# Patient Record
Sex: FEMALE | Race: BLACK OR AFRICAN AMERICAN | NOT HISPANIC OR LATINO | Employment: FULL TIME | URBAN - METROPOLITAN AREA
[De-identification: names, ages, dates, MRNs, and addresses within clinical notes are randomized per-mention and may not be internally consistent; named-entity substitution may affect disease eponyms.]

---

## 2024-04-16 ENCOUNTER — INITIAL PRENATAL (OUTPATIENT)
Dept: OBSTETRICS AND GYNECOLOGY | Facility: CLINIC | Age: 34
End: 2024-04-16
Payer: COMMERCIAL

## 2024-04-16 ENCOUNTER — LAB VISIT (OUTPATIENT)
Dept: LAB | Facility: HOSPITAL | Age: 34
End: 2024-04-16
Attending: ADVANCED PRACTICE MIDWIFE
Payer: COMMERCIAL

## 2024-04-16 VITALS
BODY MASS INDEX: 48.97 KG/M2 | WEIGHT: 266.13 LBS | DIASTOLIC BLOOD PRESSURE: 70 MMHG | SYSTOLIC BLOOD PRESSURE: 120 MMHG | HEIGHT: 62 IN

## 2024-04-16 DIAGNOSIS — Z3A.01 LESS THAN 8 WEEKS GESTATION OF PREGNANCY: Primary | ICD-10-CM

## 2024-04-16 DIAGNOSIS — O99.011 ANEMIA DURING PREGNANCY IN FIRST TRIMESTER: ICD-10-CM

## 2024-04-16 DIAGNOSIS — O99.511 ASTHMA COMPLICATING PREGNANCY IN FIRST TRIMESTER: ICD-10-CM

## 2024-04-16 DIAGNOSIS — N91.2 AMENORRHEA: ICD-10-CM

## 2024-04-16 DIAGNOSIS — O34.219 HX OF CESAREAN SECTION COMPLICATING PREGNANCY: ICD-10-CM

## 2024-04-16 DIAGNOSIS — O99.211 OBESITY AFFECTING PREGNANCY IN FIRST TRIMESTER, UNSPECIFIED OBESITY TYPE: ICD-10-CM

## 2024-04-16 DIAGNOSIS — E88.819 INSULIN RESISTANCE: ICD-10-CM

## 2024-04-16 DIAGNOSIS — J45.909 ASTHMA COMPLICATING PREGNANCY IN FIRST TRIMESTER: ICD-10-CM

## 2024-04-16 DIAGNOSIS — Z3A.01 LESS THAN 8 WEEKS GESTATION OF PREGNANCY: ICD-10-CM

## 2024-04-16 PROBLEM — E88.810 METABOLIC SYNDROME X: Status: ACTIVE | Noted: 2024-02-14

## 2024-04-16 PROBLEM — J31.0 CHRONIC RHINITIS: Status: ACTIVE | Noted: 2018-09-21

## 2024-04-16 PROBLEM — L83 ACANTHOSIS NIGRICANS: Status: ACTIVE | Noted: 2017-06-21

## 2024-04-16 PROBLEM — E55.9 VITAMIN D DEFICIENCY: Status: ACTIVE | Noted: 2024-02-14

## 2024-04-16 LAB
ABO + RH BLD: NORMAL
BLD GP AB SCN CELLS X3 SERPL QL: NORMAL
SPECIMEN OUTDATE: NORMAL

## 2024-04-16 PROCEDURE — 83036 HEMOGLOBIN GLYCOSYLATED A1C: CPT | Performed by: ADVANCED PRACTICE MIDWIFE

## 2024-04-16 PROCEDURE — 80074 ACUTE HEPATITIS PANEL: CPT | Performed by: ADVANCED PRACTICE MIDWIFE

## 2024-04-16 PROCEDURE — 87491 CHLMYD TRACH DNA AMP PROBE: CPT | Performed by: ADVANCED PRACTICE MIDWIFE

## 2024-04-16 PROCEDURE — 86593 SYPHILIS TEST NON-TREP QUANT: CPT | Performed by: ADVANCED PRACTICE MIDWIFE

## 2024-04-16 PROCEDURE — 87086 URINE CULTURE/COLONY COUNT: CPT | Performed by: ADVANCED PRACTICE MIDWIFE

## 2024-04-16 PROCEDURE — 36415 COLL VENOUS BLD VENIPUNCTURE: CPT | Performed by: ADVANCED PRACTICE MIDWIFE

## 2024-04-16 PROCEDURE — 86762 RUBELLA ANTIBODY: CPT | Performed by: ADVANCED PRACTICE MIDWIFE

## 2024-04-16 PROCEDURE — 86901 BLOOD TYPING SEROLOGIC RH(D): CPT | Performed by: ADVANCED PRACTICE MIDWIFE

## 2024-04-16 PROCEDURE — 88175 CYTOPATH C/V AUTO FLUID REDO: CPT | Performed by: ADVANCED PRACTICE MIDWIFE

## 2024-04-16 PROCEDURE — 0500F INITIAL PRENATAL CARE VISIT: CPT | Mod: CPTII,S$GLB,, | Performed by: ADVANCED PRACTICE MIDWIFE

## 2024-04-16 PROCEDURE — 85025 COMPLETE CBC W/AUTO DIFF WBC: CPT | Performed by: ADVANCED PRACTICE MIDWIFE

## 2024-04-16 PROCEDURE — 83020 HEMOGLOBIN ELECTROPHORESIS: CPT | Performed by: ADVANCED PRACTICE MIDWIFE

## 2024-04-16 PROCEDURE — 87389 HIV-1 AG W/HIV-1&-2 AB AG IA: CPT | Performed by: ADVANCED PRACTICE MIDWIFE

## 2024-04-16 PROCEDURE — 99999 PR PBB SHADOW E&M-EST. PATIENT-LVL III: CPT | Mod: PBBFAC,,, | Performed by: ADVANCED PRACTICE MIDWIFE

## 2024-04-16 RX ORDER — METFORMIN HYDROCHLORIDE 750 MG/1
750 TABLET, EXTENDED RELEASE ORAL
COMMUNITY

## 2024-04-16 RX ORDER — LORATADINE 10 MG/1
1 TABLET ORAL NIGHTLY
COMMUNITY

## 2024-04-16 RX ORDER — HYDROGEN PEROXIDE 3 %
20 SOLUTION, NON-ORAL MISCELLANEOUS
COMMUNITY

## 2024-04-16 RX ORDER — PRENATAL WITH FERROUS FUM AND FOLIC ACID 3080; 920; 120; 400; 22; 1.84; 3; 20; 10; 1; 12; 200; 27; 25; 2 [IU]/1; [IU]/1; MG/1; [IU]/1; MG/1; MG/1; MG/1; MG/1; MG/1; MG/1; UG/1; MG/1; MG/1; MG/1; MG/1
1 TABLET ORAL DAILY
Qty: 30 TABLET | Refills: 11 | Status: SHIPPED | OUTPATIENT
Start: 2024-04-16 | End: 2025-04-16

## 2024-04-16 NOTE — LETTER
April 16, 2024      [facility] - OB GYN 2nd Fl  [address] [facility]  [place] [address]  Phone: [phone]  Fax: [phone]       Patient: Sallie Morelos   YOB: 1990  Date of Visit: 04/16/2024    To Whom It May Concern:    Kavon Morelos  was at [facility] on 04/16/2024. The patient may return to work/school on 04/17/2024 with no restrictions. If you have any questions or concerns, or if I can be of further assistance, please do not hesitate to contact me.    Sincerely,    Petre Johnson CNM/chuy

## 2024-04-16 NOTE — PROGRESS NOTES
CHIEF COMPLAINT:   Patient presents with      Possible Pregnancy        HISTORY OF PRESENT ILLNESS  Sallie Morelos 34 y.o.  presents for a NOB exam. She was confirmed with PCP.   The patient has no complaints today.  No nausea or vomiting. No bleeding or pain.  Pregnancy was not planned but is desired.  Partner is supportive of pregnancy. He is a  so not at visit today. Lives at home with partner and daughter.  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  Denies domestic abuse.  Denies chemical/pesticide/radiation exposure.  OB history:      LMP: Patient's last menstrual period was 2024.  EDC: Estimated Date of Delivery: 24  EGA: 7w3d       Health Maintenance   Topic Date Due    TETANUS VACCINE  2009    Hepatitis C Screening  Completed    Lipid Panel  Completed       Past Medical History:   Diagnosis Date    Anemia     Asthma     allergy induced    Insulin resistance        Past Surgical History:   Procedure Laterality Date     SECTION         Family History   Problem Relation Name Age of Onset    Breast cancer Neg Hx      Colon cancer Neg Hx      Ovarian cancer Neg Hx      Uterine cancer Neg Hx         Social History     Socioeconomic History    Marital status: Single   Tobacco Use    Smoking status: Never    Smokeless tobacco: Never   Substance and Sexual Activity    Alcohol use: Not Currently    Drug use: Not Currently     Types: Marijuana     Comment: d/c when pregnant    Sexual activity: Yes     Partners: Male     Birth control/protection: None     Social Determinants of Health     Financial Resource Strain: High Risk (2024)    Received from [facility] Subsidi[facility] and Affiliates    Overall Financial Resource Strain (CARDIA)     Difficulty of Paying Living Expenses: Hard   Food Insecurity: Food Insecurity Present (2024)    Received from [facility] Its Subsidiaries  and Affiliates    Hunger Vital Sign     Worried About Running Out of Food in the Last Year: Often true     Ran Out of Food in the Last Year: Often true   Transportation Needs: No Transportation Needs (1/10/2023)    Received from [facility] and Its Subsidiaries and Affiliates    PRAPARE - Transportation     Lack of Transportation (Medical): No     Lack of Transportation (Non-Medical): No   Physical Activity: Inactive (2/9/2024)    Received from [facility] Its Subsidiaries and Affiliates    Exercise Vital Sign     Days of Exercise per Week: 0 days     Minutes of Exercise per Session: 0 min   Stress: Stress Concern Present (2/9/2024)    Received from [facility] Its Subsidiaries and Affiliates    [place] [employer] - Occupational Stress Questionnaire     Feeling of Stress : Rather much   Social Connections: Moderately Isolated (2/9/2024)    Received from [facility]    Social Connection and Isolation Panel [NHANES]     Frequency of Communication with Friends and Family: More than three times a week     Frequency of Social Gatherings with Friends and Family: Never     Attends Jehovah's witness Services: More than 4 times per year     Active Member of Clubs or Organizations: No     Attends Club or Organization Meetings: Never     Marital Status: Never        Current Outpatient Medications   Medication Sig Dispense Refill    DOCOSAHEXAENOIC ACID ORAL Take 1 tablet by mouth every morning.      loratadine (CLARITIN) 10 mg tablet Take 1 tablet by mouth every evening.      metFORMIN (GLUCOPHAGE-XR) 750 MG ER 24hr tablet Take 750 mg by mouth.      esomeprazole (NEXIUM) 20 MG capsule Take 20 mg by mouth before breakfast.      PNV,calcium 72/iron/folic acid (PRENATAL VITAMIN) Tab Take 1 tablet by mouth once daily.  30 tablet 11     No current facility-administered medications for this visit.       Review of patient's allergies indicates:   Allergen Reactions    Fish containing products Anxiety, Blisters, Diarrhea, Hives, Itching, Nausea And Vomiting, Rash and Shortness Of Breath     Itching, N&V    Other         PHYSICAL EXAM   Vitals:    04/16/24 1007   BP: 120/70        PAIN SCALE: 0/10 None    PHYSICAL EXAM    ROS:  GENERAL: No fever, chills, fatigability or weight loss.  CV: Denies chest pain  PULM: Denies shortness of breath or wheezing.  ABDOMEN: Appetite fine. No weight loss. Denies diarrhea, abdominal pain, hematemesis or blood in stool.  URINARY: No flank pain, dysuria or hematuria.  REPRODUCTIVE: No abnormal vaginal bleeding.       PE:   APPEARANCE: Well nourished, well developed, in no acute distress  CHEST: Clear to auscultation bilaterally  CV: Regular rate and rhythm  ABDOMEN: Soft. No tenderness or masses. No hepatosplenomegaly. No hernias  PELVIC:   VULVA: No lesions. Normal female genitalia.  URETHRAL MEATUS: Normal size and location, no lesions, no prolapse.  URETHRA: No masses, tenderness, prolapse or scarring.  VAGINA: Moist and well rugated, no discharge, no significant cystocele or rectocele.  CERVIX: No lesions, normal diameter, no stenosis, no cervical motion tenderness.   UTERUS: 6wk size, regular shape, mobile, non-tender, normal position, good support.  ADNEXA: No masses, tenderness or CDS nodularity.  ANUS PERINEUM: No lesions, no relaxation, no external hemorrhoids.     UPT +    A/P:     -      Patient was counseled today on A.C.S. Pap guidelines and recommendations for yearly pelvic exams, mammograms and monthly self breast exams; to see her PCP for other health maintenance and pregnancy.   -      Patient's medications and medical history reviewed with patient and implications in pregnancy.   -      Pregnancy course discussed and 'AtoZ' book given. Patient was counseled on proper weight gain based  on the [place] [employer] [facility]'s recommendations based on her pre-pregnancy weight. Discussed foods to avoid in pregnancy (i.e. sushi, fish that are high in mercury, deli meat, and unpasteurized cheeses). Discussed prenatal vitamin options (i.e. stool softener, DHA). Discussed potential medical problems in pregnancy.  -     Discussed risk of Toxoplasmosis transmission from pets and reviewed risk reduction techniques.  -     Pt was counseled on exercise in pregnancy and weight gain recommendations.  -     Oriented to practice including CNM collaboration.   -     Follow-up u/s tomorrow scheduled in [facility]  -     f/u 4 weeks with LEONELA  -     labs today   -     pap smear and genprobe due today

## 2024-04-17 ENCOUNTER — LAB VISIT (OUTPATIENT)
Dept: LAB | Facility: HOSPITAL | Age: 34
End: 2024-04-17
Attending: ADVANCED PRACTICE MIDWIFE
Payer: COMMERCIAL

## 2024-04-17 ENCOUNTER — PROCEDURE VISIT (OUTPATIENT)
Dept: OBSTETRICS AND GYNECOLOGY | Facility: CLINIC | Age: 34
End: 2024-04-17
Payer: COMMERCIAL

## 2024-04-17 DIAGNOSIS — N91.2 AMENORRHEA: ICD-10-CM

## 2024-04-17 DIAGNOSIS — O36.80X0 PREGNANCY WITH INCONCLUSIVE FETAL VIABILITY, SINGLE OR UNSPECIFIED FETUS: Primary | ICD-10-CM

## 2024-04-17 DIAGNOSIS — Z3A.01 LESS THAN 8 WEEKS GESTATION OF PREGNANCY: ICD-10-CM

## 2024-04-17 DIAGNOSIS — O36.80X0 PREGNANCY WITH INCONCLUSIVE FETAL VIABILITY, SINGLE OR UNSPECIFIED FETUS: ICD-10-CM

## 2024-04-17 LAB
BASOPHILS # BLD AUTO: 0.02 K/UL (ref 0–0.2)
BASOPHILS NFR BLD: 0.4 % (ref 0–1.9)
DIFFERENTIAL METHOD BLD: ABNORMAL
EOSINOPHIL # BLD AUTO: 0.1 K/UL (ref 0–0.5)
EOSINOPHIL NFR BLD: 1.3 % (ref 0–8)
ERYTHROCYTE [DISTWIDTH] IN BLOOD BY AUTOMATED COUNT: 13.9 % (ref 11.5–14.5)
ESTIMATED AVG GLUCOSE: 103 MG/DL (ref 68–131)
HAV IGM SERPL QL IA: NORMAL
HBA1C MFR BLD: 5.2 % (ref 4–5.6)
HBV CORE IGM SERPL QL IA: NORMAL
HBV SURFACE AG SERPL QL IA: NORMAL
HCG INTACT+B SERPL-ACNC: NORMAL MIU/ML
HCT VFR BLD AUTO: 38.6 % (ref 37–48.5)
HCV AB SERPL QL IA: NORMAL
HGB A2 MFR BLD HPLC: 2.4 % (ref 2.2–3.2)
HGB BLD-MCNC: 13 G/DL (ref 12–16)
HGB FRACT BLD ELPH-IMP: NORMAL
HGB FRACT BLD ELPH-IMP: NORMAL
HIV 1+2 AB+HIV1 P24 AG SERPL QL IA: NORMAL
IMM GRANULOCYTES # BLD AUTO: 0.01 K/UL (ref 0–0.04)
IMM GRANULOCYTES NFR BLD AUTO: 0.2 % (ref 0–0.5)
LYMPHOCYTES # BLD AUTO: 1.7 K/UL (ref 1–4.8)
LYMPHOCYTES NFR BLD: 32.5 % (ref 18–48)
MCH RBC QN AUTO: 28.9 PG (ref 27–31)
MCHC RBC AUTO-ENTMCNC: 33.7 G/DL (ref 32–36)
MCV RBC AUTO: 86 FL (ref 82–98)
MONOCYTES # BLD AUTO: 0.4 K/UL (ref 0.3–1)
MONOCYTES NFR BLD: 7.5 % (ref 4–15)
NEUTROPHILS # BLD AUTO: 3.1 K/UL (ref 1.8–7.7)
NEUTROPHILS NFR BLD: 58.1 % (ref 38–73)
NRBC BLD-RTO: 0 /100 WBC
PLATELET # BLD AUTO: 173 K/UL (ref 150–450)
PMV BLD AUTO: 13.3 FL (ref 9.2–12.9)
RBC # BLD AUTO: 4.5 M/UL (ref 4–5.4)
RUBV IGG SER-ACNC: 10.5 IU/ML
RUBV IGG SER-IMP: REACTIVE
TREPONEMA PALLIDUM IGG+IGM AB [PRESENCE] IN SERUM OR PLASMA BY IMMUNOASSAY: NONREACTIVE
WBC # BLD AUTO: 5.33 K/UL (ref 3.9–12.7)

## 2024-04-17 PROCEDURE — 84702 CHORIONIC GONADOTROPIN TEST: CPT | Performed by: ADVANCED PRACTICE MIDWIFE

## 2024-04-17 PROCEDURE — 36415 COLL VENOUS BLD VENIPUNCTURE: CPT | Mod: PO | Performed by: ADVANCED PRACTICE MIDWIFE

## 2024-04-17 PROCEDURE — 76801 OB US < 14 WKS SINGLE FETUS: CPT | Mod: S$GLB,,, | Performed by: OBSTETRICS & GYNECOLOGY

## 2024-04-18 ENCOUNTER — PATIENT MESSAGE (OUTPATIENT)
Dept: OBSTETRICS AND GYNECOLOGY | Facility: CLINIC | Age: 34
End: 2024-04-18
Payer: COMMERCIAL

## 2024-04-18 ENCOUNTER — TELEPHONE (OUTPATIENT)
Dept: OBSTETRICS AND GYNECOLOGY | Facility: CLINIC | Age: 34
End: 2024-04-18
Payer: COMMERCIAL

## 2024-04-18 LAB — BACTERIA UR CULT: NORMAL

## 2024-04-18 NOTE — TELEPHONE ENCOUNTER
Spoke with patient via my chart, advised message sent to Peter and Peter has been notified via text.

## 2024-04-18 NOTE — TELEPHONE ENCOUNTER
----- Message from Filomena Shanda sent at 4/18/2024 11:22 AM CDT -----  Type:  Needs Medical Advice    Who Called: Sallie Morelos    Symptoms (please be specific): -   How long has patient had these symptoms:  -  Pharmacy name and phone #:  -  Would the patient rather a call back or a response via [facility]?    Best Call Back Number: [phone]    Additional Information:pt would like to speak w/ nurse please call ( she wants to know if there is an appt available on tomorrow )

## 2024-04-19 ENCOUNTER — ROUTINE PRENATAL (OUTPATIENT)
Dept: OBSTETRICS AND GYNECOLOGY | Facility: CLINIC | Age: 34
End: 2024-04-19
Payer: COMMERCIAL

## 2024-04-19 ENCOUNTER — LAB VISIT (OUTPATIENT)
Dept: LAB | Facility: HOSPITAL | Age: 34
End: 2024-04-19
Attending: ADVANCED PRACTICE MIDWIFE
Payer: COMMERCIAL

## 2024-04-19 VITALS — DIASTOLIC BLOOD PRESSURE: 76 MMHG | SYSTOLIC BLOOD PRESSURE: 120 MMHG | BODY MASS INDEX: 49.8 KG/M2 | WEIGHT: 272.25 LBS

## 2024-04-19 DIAGNOSIS — O36.80X0 PREGNANCY WITH INCONCLUSIVE FETAL VIABILITY, SINGLE OR UNSPECIFIED FETUS: ICD-10-CM

## 2024-04-19 DIAGNOSIS — O36.80X0 PREGNANCY WITH INCONCLUSIVE FETAL VIABILITY, SINGLE OR UNSPECIFIED FETUS: Primary | ICD-10-CM

## 2024-04-19 LAB
CLINICAL INFO: NORMAL
CYTO CVX: NORMAL
CYTOLOGIST CVX/VAG CYTO: NORMAL
CYTOLOGIST CVX/VAG CYTO: NORMAL
CYTOLOGY CMNT CVX/VAG CYTO-IMP: NORMAL
CYTOLOGY PAP THIN PREP EXPLANATION: NORMAL
DATE OF PREVIOUS PAP: NORMAL
DATE PREVIOUS BX: NO
GEN CATEG CVX/VAG CYTO-IMP: NORMAL
HCG INTACT+B SERPL-ACNC: NORMAL MIU/ML
LMP START DATE: NORMAL
MICROORGANISM CVX/VAG CYTO: NORMAL
PATHOLOGIST CVX/VAG CYTO: NORMAL
SERVICE CMNT-IMP: NORMAL
SPECIMEN SOURCE CVX/VAG CYTO: NORMAL
STAT OF ADQ CVX/VAG CYTO-IMP: NORMAL

## 2024-04-19 PROCEDURE — 99212 OFFICE O/P EST SF 10 MIN: CPT | Mod: S$GLB,,, | Performed by: OBSTETRICS & GYNECOLOGY

## 2024-04-19 PROCEDURE — 36415 COLL VENOUS BLD VENIPUNCTURE: CPT | Performed by: ADVANCED PRACTICE MIDWIFE

## 2024-04-19 PROCEDURE — 99999 PR PBB SHADOW E&M-EST. PATIENT-LVL II: CPT | Mod: PBBFAC,,, | Performed by: OBSTETRICS & GYNECOLOGY

## 2024-04-19 PROCEDURE — 84702 CHORIONIC GONADOTROPIN TEST: CPT | Performed by: ADVANCED PRACTICE MIDWIFE

## 2024-04-19 NOTE — PROGRESS NOTES
Subjective     Patient ID: Sallie Morelos is a 34 y.o. female.    Chief Complaint:  possible MAB      History of Present Illness  HPI  Pt is here for another opinion.  Pt has been seeing CNM for this pregnancy.  Was recently informed that her US was inconclusive for viability and pt wanted to discuss this.  Reports pregnancy was intentional and desired.  Denies any pains, bleeding, or fever.    GYN & OB History  Patient's last menstrual period was 2024.   Date of Last Pap: No result found    OB History    Para Term  AB Living   2 1 1     1   SAB IAB Ectopic Multiple Live Births           1      # Outcome Date GA Lbr Farhad/2nd Weight Sex Type Anes PTL Lv   2 Current            1 Term 08 40w0d  3.175 kg (7 lb) F CS-Unspec   NIKKO       Review of Systems  Review of Systems   Constitutional:  Negative for activity change, appetite change, chills, fatigue, fever and unexpected weight change.   Respiratory:  Negative for shortness of breath.    Cardiovascular:  Negative for chest pain, palpitations and leg swelling.   Gastrointestinal:  Negative for abdominal pain, bloating, blood in stool, constipation, diarrhea, nausea and vomiting.   Genitourinary:  Negative for dysuria, flank pain, frequency, genital sores, hematuria, menorrhagia, pelvic pain, urgency, vaginal bleeding, vaginal discharge, vaginal pain, vaginal dryness and vaginal odor.   Musculoskeletal:  Negative for back pain.   Neurological:  Negative for syncope and headaches.        Objective   Physical Exam:   Constitutional: She is oriented to person, place, and time. She appears well-developed and well-nourished. No distress.                           Neurological: She is alert and oriented to person, place, and time.     Psychiatric: She has a normal mood and affect. Her behavior is normal. Thought content normal.      HC/17 - 73,817  3/28 - 1,117  3/26 - 289     US : GS 8w1d, no YS; CRL 6.9 mm (6w4d) with absent fetal heart  activity     Assessment and Plan     1. Pregnancy with inconclusive fetal viability, single or unspecified fetus           Plan:  Pregnancy with inconclusive fetal viability, single or unspecified fetus  -     Reviewed labs and US.  Overall, findings are highly suggestive of MAB.  Pt currently asymptomatic and is stable.  She has repeat labs and US ordered.  Pt counseled on options, including expectant vs medical vs surgical.  Risks and benefits of each were discussed.  Pt will await results for confirmation.  If Ab confirmed, pt is interested in medical intervention.      Follow up in about 1 week (around 4/26/2024).

## 2024-04-19 NOTE — LETTER
April 19, 2024      [facility] - [facility] 2nd Fl  [address] [facility]  [place] [address]  Phone: [phone]  Fax: [phone]       Patient: Sallie Morelos   YOB: 1990  Date of Visit: 04/19/2024    To Whom It May Concern:    Kavon Morelos  was at [facility] on 04/19/2024. The patient may return to work/school on 0419/2024 with no restrictions. If you have any questions or concerns, or if I can be of further assistance, please do not hesitate to contact me.    Sincerely,    Zak Blackwood MD

## 2024-04-20 ENCOUNTER — NURSE TRIAGE (OUTPATIENT)
Dept: ADMINISTRATIVE | Facility: CLINIC | Age: 34
End: 2024-04-20
Payer: COMMERCIAL

## 2024-04-20 LAB
C TRACH DNA SPEC QL NAA+PROBE: NOT DETECTED
N GONORRHOEA DNA SPEC QL NAA+PROBE: NOT DETECTED

## 2024-04-20 NOTE — TELEPHONE ENCOUNTER
"[place]    PCP:  Erika Brooke NP    She reports miscarriage and MD was going to send over a prescription but he wanted to get more labs to make sure that she has had a miscarriage.  Pt states that she saw her labs and she has had a miscarriage but that is unconfirmed by MD.  C/O feels like she has the flu, palpitations (irregular "flutters"), chills, dizziness, intermittent lower abdominal pain, nausea, generalized weakness, and HA.  Denies passed tissue, vaginal bleeding, vaginal discharge, fever, vomiting, numbness, earache, SOB, and CP.  Per protocol, care advised is go to ED now.  Pt VU.  Advised to call for worsening/questions/concerns.  VU.    Reason for Disposition   Patient sounds very sick or weak to the triager   Patient sounds very sick or weak to the triager   Dizziness, lightheadedness, or weakness    Additional Information   Negative: Shock suspected (e.g., cold/pale/clammy skin, too weak to stand, low BP, rapid pulse)   Negative: Difficult to awaken or acting confused (e.g., disoriented, slurred speech)   Negative: Passed out (i.e., lost consciousness, collapsed and was not responding)   Negative: Sounds like a life-threatening emergency to the triager   Negative: SEVERE abdominal pain   Negative: SEVERE vaginal bleeding (e.g., soaking 2 pads per hour or large blood clots and present 2 or more hours)   Negative: MODERATE vaginal bleeding (e.g., soaking 1 pad or tampon per hour and present > 6 hours; 1 menstrual cup every 6 hours)   Negative: Pale skin (pallor) of new-onset or worsening   Negative: [1] Weakness (i.e., paralysis, loss of muscle strength) of the face, arm or leg on one side of the body AND [2] sudden onset AND [3] present now   Negative: [1] Numbness (i.e., loss of sensation) of the face, arm or leg on one side of the body AND [2] sudden onset AND [3] present now   Negative: [1] Loss of speech or garbled speech AND [2] sudden onset AND [3] present now   Negative: Difficult to awaken or " acting confused (e.g., disoriented, slurred speech)   Negative: Sounds like a life-threatening emergency to the triager   Negative: SEVERE dizziness (vertigo) (e.g., unable to walk without assistance)   Negative: Severe headache (e.g., excruciating)   (Exception: Similar to previous migraines.)   Negative: [1] Dizziness (vertigo) present now AND [2] one or more STROKE RISK FACTORS (i.e., hypertension, diabetes, prior stroke/TIA, heart attack)  (Exception: Prior doctor or NP/PA evaluation for this AND no different/worse than usual.)   Negative: [1] Dizziness (vertigo) present now AND [2] age > 59   (Exception: Prior doctor or NP/PA evaluation for this AND no different/worse than usual.)   Negative: [1] Weakness (i.e., paralysis, loss of muscle strength) of the face, arm / hand, or leg / foot on one side of the body AND [2] sudden onset AND [3] brief (now gone)   Negative: [1] Numbness (i.e., loss of sensation) of the face, arm / hand, or leg / foot on one side of the body AND [2] sudden onset AND [3] brief (now gone)   Negative: [1] Loss of speech or garbled speech AND [2] sudden onset AND [3] brief (now gone)   Negative: Loss of vision or double vision  (Exception: Similar to previous migraines.)   Negative: Passed out (i.e., lost consciousness, collapsed and was not responding)   Negative: Shock suspected (e.g., cold/pale/clammy skin, too weak to stand, low BP, rapid pulse)   Negative: Difficult to awaken or acting confused (e.g., disoriented, slurred speech)   Negative: Visible sweat on face or sweat dripping down face   Negative: Unable to walk, or can only walk with assistance (e.g., requires support)   Negative: [1] Received SHOCK from implantable cardiac defibrillator AND [2] persisting symptoms (i.e., palpitations, lightheadedness)   Negative: [1] Dizziness, lightheadedness, or weakness AND [2] heart beating very rapidly (e.g., > 140 / minute)   Negative: [1] Dizziness, lightheadedness, or weakness AND [2]  heart beating very slowly (e.g., < 50 / minute)   Negative: Sounds like a life-threatening emergency to the triager   Negative: Difficulty breathing    Protocols used:  - Threatened Miscarriage Follow-up Call-A-, Dizziness - Vertigo-A-, Heart Rate and Heartbeat [id number]

## 2024-04-22 ENCOUNTER — PROCEDURE VISIT (OUTPATIENT)
Dept: OBSTETRICS AND GYNECOLOGY | Facility: CLINIC | Age: 34
End: 2024-04-22
Payer: COMMERCIAL

## 2024-04-22 DIAGNOSIS — O36.80X0 PREGNANCY WITH INCONCLUSIVE FETAL VIABILITY, SINGLE OR UNSPECIFIED FETUS: ICD-10-CM

## 2024-04-22 DIAGNOSIS — O02.1 MISSED AB: Primary | ICD-10-CM

## 2024-04-22 PROCEDURE — 76815 OB US LIMITED FETUS(S): CPT | Mod: S$GLB,,, | Performed by: OBSTETRICS & GYNECOLOGY

## 2024-04-22 RX ORDER — HYDROCODONE BITARTRATE AND ACETAMINOPHEN 5; 325 MG/1; MG/1
1 TABLET ORAL EVERY 6 HOURS PRN
Qty: 10 TABLET | Refills: 0 | Status: SHIPPED | OUTPATIENT
Start: 2024-04-22 | End: 2024-04-22 | Stop reason: SDUPTHER

## 2024-04-22 RX ORDER — MISOPROSTOL 200 UG/1
800 TABLET ORAL EVERY 12 HOURS
Qty: 8 TABLET | Refills: 0 | Status: SHIPPED | OUTPATIENT
Start: 2024-04-22 | End: 2024-04-24 | Stop reason: SDUPTHER

## 2024-04-22 RX ORDER — HYDROCODONE BITARTRATE AND ACETAMINOPHEN 5; 325 MG/1; MG/1
1 TABLET ORAL EVERY 6 HOURS PRN
Qty: 10 TABLET | Refills: 0 | Status: SHIPPED | OUTPATIENT
Start: 2024-04-22 | End: 2024-04-23

## 2024-04-22 RX ORDER — PROMETHAZINE HYDROCHLORIDE 12.5 MG/1
12.5 TABLET ORAL 4 TIMES DAILY
Qty: 30 TABLET | Refills: 0 | Status: SHIPPED | OUTPATIENT
Start: 2024-04-22

## 2024-04-23 ENCOUNTER — TELEPHONE (OUTPATIENT)
Dept: OBSTETRICS AND GYNECOLOGY | Facility: CLINIC | Age: 34
End: 2024-04-23
Payer: COMMERCIAL

## 2024-04-23 DIAGNOSIS — O03.9 MISCARRIAGE: Primary | ICD-10-CM

## 2024-04-23 RX ORDER — HYDROCODONE BITARTRATE AND ACETAMINOPHEN 5; 325 MG/1; MG/1
1 TABLET ORAL EVERY 6 HOURS PRN
Qty: 10 TABLET | Refills: 0 | Status: SHIPPED | OUTPATIENT
Start: 2024-04-23 | End: 2024-04-24 | Stop reason: SDUPTHER

## 2024-04-23 NOTE — TELEPHONE ENCOUNTER
----- Message from Luz Marina Bernstein CNM sent at 4/23/2024  4:21 PM CDT -----  Contact: pt  Yes, it looks like it was accidentally printed and not sent. Doing it now.  ----- Message -----  From: Elly Nielson LPN  Sent: 4/23/2024   4:07 PM CDT  To: Luz Marina Bernstein CNM    Hi, are you able to send in a script for a pt of Peter's, she had a missed ab and completed cytotec but needs her Norco  ----- Message -----  From: Prema Salcedo  Sent: 4/23/2024   3:57 PM CDT  To: Alex Green Staff    Pt is calling in rgd to her med, HYDROcodone-acetaminophen (NORCO) 5-325 mg per tablet and states the pharm says they don't have a script from the office.  Please call pt back at  [phone]  [email or web address]

## 2024-04-23 NOTE — TELEPHONE ENCOUNTER
----- Message from Prema Salcedo sent at 4/23/2024  3:55 PM CDT -----  Contact: pt  Pt is calling in rgd to her med, HYDROcodone-acetaminophen (NORCO) 5-325 mg per tablet and states the pharm says they don't have a script from the office.  Please call pt back at  [phone]  [email or web address]

## 2024-04-23 NOTE — TELEPHONE ENCOUNTER
Spoke with patient and advised that I have my midwife on call resubmitting the script and I apologized for the inconvenience.

## 2024-04-24 DIAGNOSIS — O02.1 MISSED AB: ICD-10-CM

## 2024-04-24 DIAGNOSIS — O03.9 MISCARRIAGE: ICD-10-CM

## 2024-04-24 RX ORDER — MISOPROSTOL 200 UG/1
800 TABLET ORAL EVERY 12 HOURS
Qty: 8 TABLET | Refills: 0 | Status: SHIPPED | OUTPATIENT
Start: 2024-04-24 | End: 2024-04-25

## 2024-04-24 RX ORDER — HYDROCODONE BITARTRATE AND ACETAMINOPHEN 5; 325 MG/1; MG/1
1 TABLET ORAL EVERY 6 HOURS PRN
Qty: 10 TABLET | Refills: 0 | Status: SHIPPED | OUTPATIENT
Start: 2024-04-24

## 2024-04-24 NOTE — PROGRESS NOTES
4/22/24: Pt presents for ultrasound  Ultrasound today shows FP measuring 6w3d with NO FHTs, fluid seen in the cul-de-sac, Reviewed with pt and options reviewed including expectant management, cytotec and D&C, pt would like cytotec, reviewed when and how to take, side effects and other medications including norco and phenergan sent to pharmacy. Warning signs reviewed, when to go to ER. Follow up in a couple of days.

## 2024-04-29 ENCOUNTER — PATIENT MESSAGE (OUTPATIENT)
Dept: OBSTETRICS AND GYNECOLOGY | Facility: CLINIC | Age: 34
End: 2024-04-29
Payer: COMMERCIAL

## 2024-06-22 ENCOUNTER — PATIENT MESSAGE (OUTPATIENT)
Dept: OTHER | Facility: OTHER | Age: 34
End: 2024-06-22
Payer: COMMERCIAL

## 2024-07-13 ENCOUNTER — PATIENT MESSAGE (OUTPATIENT)
Dept: OTHER | Facility: OTHER | Age: 34
End: 2024-07-13
Payer: COMMERCIAL

## 2024-08-10 ENCOUNTER — PATIENT MESSAGE (OUTPATIENT)
Dept: OTHER | Facility: OTHER | Age: 34
End: 2024-08-10
Payer: COMMERCIAL

## 2024-08-24 ENCOUNTER — PATIENT MESSAGE (OUTPATIENT)
Dept: OTHER | Facility: OTHER | Age: 34
End: 2024-08-24
Payer: COMMERCIAL

## 2024-09-07 ENCOUNTER — PATIENT MESSAGE (OUTPATIENT)
Dept: OTHER | Facility: OTHER | Age: 34
End: 2024-09-07
Payer: COMMERCIAL

## 2024-09-21 ENCOUNTER — PATIENT MESSAGE (OUTPATIENT)
Dept: OTHER | Facility: OTHER | Age: 34
End: 2024-09-21
Payer: COMMERCIAL

## 2024-10-05 ENCOUNTER — PATIENT MESSAGE (OUTPATIENT)
Dept: OTHER | Facility: OTHER | Age: 34
End: 2024-10-05
Payer: COMMERCIAL

## 2024-10-26 ENCOUNTER — PATIENT MESSAGE (OUTPATIENT)
Dept: OTHER | Facility: OTHER | Age: 34
End: 2024-10-26
Payer: COMMERCIAL